# Patient Record
(demographics unavailable — no encounter records)

---

## 2025-07-01 NOTE — ASSESSMENT
[FreeTextEntry1] : Arianna Correia presents for evaluation of chronic hearing loss. She notes hearing loss for over 20 years and has hearing aids. Otoscopic exam wnl. 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 She will continue to wear her hearing aids and attempt to f/u with her outside audiologist.  - f/u in 1 year w/ audio.

## 2025-07-01 NOTE — HISTORY OF PRESENT ILLNESS
[de-identified] : Arianna Correia is a 78-year-old female with history of tonsillectomy, CAD, hearing loss, previous DVT and TIA on Xarelto and aspirin presents for evaluation of hearing loss. She states she has had hearing loss, right worse than left since 2007. She states she was told by her cardiologist and prior ENT that she had a TIA in 2007 that caused damage to her hearing. She notes she has had hearing aids and is doing well with them. She notes last audiogram was more than a year ago, she does not have this report. She notes intermittent right nonpulsatile tinnitus. She denies vertigo. She denies otalgia or otorrhea. She denies recent ear infections or fever. She denies family history of hearing loss. She denies prior ototoxic drug exposure or significant loud noise exposure. She denies head trauma.   She does not remember having MRI brain/IAC.

## 2025-07-01 NOTE — DATA REVIEWED
[de-identified] : Audiogram 7/1/25 -  Type C tymp AD, type A tymp AS. Mild sloping to severe SNHL AU.